# Patient Record
Sex: FEMALE | Race: BLACK OR AFRICAN AMERICAN | NOT HISPANIC OR LATINO | URBAN - METROPOLITAN AREA
[De-identification: names, ages, dates, MRNs, and addresses within clinical notes are randomized per-mention and may not be internally consistent; named-entity substitution may affect disease eponyms.]

---

## 2017-06-24 ENCOUNTER — INPATIENT (INPATIENT)
Facility: HOSPITAL | Age: 22
LOS: 0 days | Discharge: HOME | DRG: 747 | End: 2017-06-24
Attending: OBSTETRICS & GYNECOLOGY | Admitting: OBSTETRICS & GYNECOLOGY
Payer: COMMERCIAL

## 2017-06-24 VITALS
HEART RATE: 64 BPM | TEMPERATURE: 98 F | RESPIRATION RATE: 18 BRPM | SYSTOLIC BLOOD PRESSURE: 112 MMHG | DIASTOLIC BLOOD PRESSURE: 68 MMHG

## 2017-06-24 VITALS
HEART RATE: 85 BPM | DIASTOLIC BLOOD PRESSURE: 77 MMHG | TEMPERATURE: 98 F | OXYGEN SATURATION: 99 % | RESPIRATION RATE: 16 BRPM | SYSTOLIC BLOOD PRESSURE: 108 MMHG

## 2017-06-24 DIAGNOSIS — R10.9 UNSPECIFIED ABDOMINAL PAIN: ICD-10-CM

## 2017-06-24 DIAGNOSIS — Z33.2 ENCOUNTER FOR ELECTIVE TERMINATION OF PREGNANCY: Chronic | ICD-10-CM

## 2017-06-24 DIAGNOSIS — Z98.890 OTHER SPECIFIED POSTPROCEDURAL STATES: ICD-10-CM

## 2017-06-24 LAB
ALBUMIN SERPL ELPH-MCNC: 3.5 G/DL — SIGNIFICANT CHANGE UP (ref 3.3–5)
ALP SERPL-CCNC: 37 U/L — LOW (ref 40–120)
ALT FLD-CCNC: 13 U/L RC — SIGNIFICANT CHANGE UP (ref 10–45)
ANION GAP SERPL CALC-SCNC: 12 MMOL/L — SIGNIFICANT CHANGE UP (ref 5–17)
APTT BLD: 30.8 SEC — SIGNIFICANT CHANGE UP (ref 27.5–37.4)
AST SERPL-CCNC: 19 U/L — SIGNIFICANT CHANGE UP (ref 10–40)
BASOPHILS # BLD AUTO: 0.1 K/UL — SIGNIFICANT CHANGE UP (ref 0–0.2)
BASOPHILS NFR BLD AUTO: 0.6 % — SIGNIFICANT CHANGE UP (ref 0–2)
BILIRUB SERPL-MCNC: 0.2 MG/DL — SIGNIFICANT CHANGE UP (ref 0.2–1.2)
BLD GP AB SCN SERPL QL: NEGATIVE — SIGNIFICANT CHANGE UP
BUN SERPL-MCNC: 6 MG/DL — LOW (ref 7–23)
CALCIUM SERPL-MCNC: 8.8 MG/DL — SIGNIFICANT CHANGE UP (ref 8.4–10.5)
CHLORIDE SERPL-SCNC: 101 MMOL/L — SIGNIFICANT CHANGE UP (ref 96–108)
CO2 SERPL-SCNC: 21 MMOL/L — LOW (ref 22–31)
CREAT SERPL-MCNC: 0.54 MG/DL — SIGNIFICANT CHANGE UP (ref 0.5–1.3)
EOSINOPHIL # BLD AUTO: 0.1 K/UL — SIGNIFICANT CHANGE UP (ref 0–0.5)
EOSINOPHIL NFR BLD AUTO: 0.4 % — SIGNIFICANT CHANGE UP (ref 0–6)
GLUCOSE SERPL-MCNC: 186 MG/DL — HIGH (ref 70–99)
HCT VFR BLD CALC: 32.2 % — LOW (ref 34.5–45)
HCT VFR BLD CALC: 34.8 % — SIGNIFICANT CHANGE UP (ref 34.5–45)
HGB BLD-MCNC: 10.8 G/DL — LOW (ref 11.5–15.5)
HGB BLD-MCNC: 11.8 G/DL — SIGNIFICANT CHANGE UP (ref 11.5–15.5)
INR BLD: 1 RATIO — SIGNIFICANT CHANGE UP (ref 0.88–1.16)
LYMPHOCYTES # BLD AUTO: 1.5 K/UL — SIGNIFICANT CHANGE UP (ref 1–3.3)
LYMPHOCYTES # BLD AUTO: 11.3 % — LOW (ref 13–44)
MCHC RBC-ENTMCNC: 30.5 PG — SIGNIFICANT CHANGE UP (ref 27–34)
MCHC RBC-ENTMCNC: 30.6 PG — SIGNIFICANT CHANGE UP (ref 27–34)
MCHC RBC-ENTMCNC: 33.6 GM/DL — SIGNIFICANT CHANGE UP (ref 32–36)
MCHC RBC-ENTMCNC: 33.9 GM/DL — SIGNIFICANT CHANGE UP (ref 32–36)
MCV RBC AUTO: 89.9 FL — SIGNIFICANT CHANGE UP (ref 80–100)
MCV RBC AUTO: 91.1 FL — SIGNIFICANT CHANGE UP (ref 80–100)
MONOCYTES # BLD AUTO: 0.9 K/UL — SIGNIFICANT CHANGE UP (ref 0–0.9)
MONOCYTES NFR BLD AUTO: 6.8 % — SIGNIFICANT CHANGE UP (ref 2–14)
NEUTROPHILS # BLD AUTO: 11 K/UL — HIGH (ref 1.8–7.4)
NEUTROPHILS NFR BLD AUTO: 80.9 % — HIGH (ref 43–77)
PLATELET # BLD AUTO: 212 K/UL — SIGNIFICANT CHANGE UP (ref 150–400)
PLATELET # BLD AUTO: 228 K/UL — SIGNIFICANT CHANGE UP (ref 150–400)
POTASSIUM SERPL-MCNC: 4 MMOL/L — SIGNIFICANT CHANGE UP (ref 3.5–5.3)
POTASSIUM SERPL-SCNC: 4 MMOL/L — SIGNIFICANT CHANGE UP (ref 3.5–5.3)
PROT SERPL-MCNC: 6.4 G/DL — SIGNIFICANT CHANGE UP (ref 6–8.3)
PROTHROM AB SERPL-ACNC: 10.9 SEC — SIGNIFICANT CHANGE UP (ref 9.8–12.7)
RBC # BLD: 3.53 M/UL — LOW (ref 3.8–5.2)
RBC # BLD: 3.87 M/UL — SIGNIFICANT CHANGE UP (ref 3.8–5.2)
RBC # FLD: 11.8 % — SIGNIFICANT CHANGE UP (ref 10.3–14.5)
RBC # FLD: 11.8 % — SIGNIFICANT CHANGE UP (ref 10.3–14.5)
RH IG SCN BLD-IMP: POSITIVE — SIGNIFICANT CHANGE UP
SODIUM SERPL-SCNC: 134 MMOL/L — LOW (ref 135–145)
WBC # BLD: 13.6 K/UL — HIGH (ref 3.8–10.5)
WBC # BLD: 19.1 K/UL — HIGH (ref 3.8–10.5)
WBC # FLD AUTO: 13.6 K/UL — HIGH (ref 3.8–10.5)
WBC # FLD AUTO: 19.1 K/UL — HIGH (ref 3.8–10.5)

## 2017-06-24 PROCEDURE — 49320 DIAG LAPARO SEPARATE PROC: CPT | Mod: 62,GC

## 2017-06-24 PROCEDURE — 85730 THROMBOPLASTIN TIME PARTIAL: CPT

## 2017-06-24 PROCEDURE — 99285 EMERGENCY DEPT VISIT HI MDM: CPT | Mod: 25

## 2017-06-24 PROCEDURE — 85610 PROTHROMBIN TIME: CPT

## 2017-06-24 PROCEDURE — 86850 RBC ANTIBODY SCREEN: CPT

## 2017-06-24 PROCEDURE — C1889: CPT

## 2017-06-24 PROCEDURE — 85027 COMPLETE CBC AUTOMATED: CPT

## 2017-06-24 PROCEDURE — 49320 DIAG LAPARO SEPARATE PROC: CPT

## 2017-06-24 PROCEDURE — 86901 BLOOD TYPING SEROLOGIC RH(D): CPT

## 2017-06-24 PROCEDURE — 80053 COMPREHEN METABOLIC PANEL: CPT

## 2017-06-24 PROCEDURE — 99285 EMERGENCY DEPT VISIT HI MDM: CPT

## 2017-06-24 PROCEDURE — 86900 BLOOD TYPING SEROLOGIC ABO: CPT

## 2017-06-24 RX ORDER — MORPHINE SULFATE 50 MG/1
4 CAPSULE, EXTENDED RELEASE ORAL ONCE
Qty: 0 | Refills: 0 | Status: DISCONTINUED | OUTPATIENT
Start: 2017-06-24 | End: 2017-06-24

## 2017-06-24 RX ORDER — OXYCODONE HYDROCHLORIDE 5 MG/1
1 TABLET ORAL
Qty: 20 | Refills: 0 | OUTPATIENT
Start: 2017-06-24

## 2017-06-24 RX ORDER — ACETAMINOPHEN 500 MG
2 TABLET ORAL
Qty: 0 | Refills: 0 | COMMUNITY

## 2017-06-24 RX ORDER — ONDANSETRON 8 MG/1
4 TABLET, FILM COATED ORAL ONCE
Qty: 0 | Refills: 0 | Status: DISCONTINUED | OUTPATIENT
Start: 2017-06-24 | End: 2017-06-24

## 2017-06-24 RX ORDER — SODIUM CHLORIDE 9 MG/ML
1000 INJECTION INTRAMUSCULAR; INTRAVENOUS; SUBCUTANEOUS ONCE
Qty: 0 | Refills: 0 | Status: COMPLETED | OUTPATIENT
Start: 2017-06-24 | End: 2017-06-24

## 2017-06-24 RX ORDER — HYDROMORPHONE HYDROCHLORIDE 2 MG/ML
0.25 INJECTION INTRAMUSCULAR; INTRAVENOUS; SUBCUTANEOUS
Qty: 0 | Refills: 0 | Status: DISCONTINUED | OUTPATIENT
Start: 2017-06-24 | End: 2017-06-24

## 2017-06-24 RX ORDER — IBUPROFEN 200 MG
1 TABLET ORAL
Qty: 0 | Refills: 0 | COMMUNITY

## 2017-06-24 RX ORDER — SODIUM CHLORIDE 9 MG/ML
1000 INJECTION, SOLUTION INTRAVENOUS
Qty: 0 | Refills: 0 | Status: DISCONTINUED | OUTPATIENT
Start: 2017-06-24 | End: 2017-06-24

## 2017-06-24 RX ADMIN — MORPHINE SULFATE 4 MILLIGRAM(S): 50 CAPSULE, EXTENDED RELEASE ORAL at 16:03

## 2017-06-24 RX ADMIN — MORPHINE SULFATE 4 MILLIGRAM(S): 50 CAPSULE, EXTENDED RELEASE ORAL at 14:24

## 2017-06-24 RX ADMIN — SODIUM CHLORIDE 125 MILLILITER(S): 9 INJECTION, SOLUTION INTRAVENOUS at 20:47

## 2017-06-24 RX ADMIN — SODIUM CHLORIDE 2000 MILLILITER(S): 9 INJECTION INTRAMUSCULAR; INTRAVENOUS; SUBCUTANEOUS at 16:18

## 2017-06-24 RX ADMIN — MORPHINE SULFATE 4 MILLIGRAM(S): 50 CAPSULE, EXTENDED RELEASE ORAL at 15:57

## 2017-06-24 NOTE — ED PROCEDURE NOTE - GENERAL PROCEDURE NAME
POCUS: Emergency Department Focused Ultrasound performed at patient's bedside.  The complete report will be available in PACS. POCUS: Emergency Department Focused Ultrasound performed at patient's bedside.

## 2017-06-24 NOTE — BRIEF OPERATIVE NOTE - PRE-OP DX
Uterine perforation  06/24/2017    Active  Ion Crawley
Uterine perforation  06/24/2017    Active  Ion Crawley

## 2017-06-24 NOTE — ED ADULT NURSE NOTE - OBJECTIVE STATEMENT
no distress. [facility] and was [facility] to [facility]. [facility]. Mild vaginal bleeding noted. Pt anxious. Emotional support offered.

## 2017-06-24 NOTE — ASU DISCHARGE PLAN (ADULT/PEDIATRIC). - NOTIFY
GYN Fever>100.4/Persistent Nausea and Vomiting/Bleeding that does not stop/Unable to Urinate/Pain not relieved by Medications

## 2017-06-24 NOTE — H&P ADULT - PROBLEM SELECTOR PLAN 1
to OR for diagnostic laparoscopy, possible operative laparoscopy, possible repair of bowel or bladder, possible laparotomy.  -NPO, IVF, pain control PRN  -preop labs sent  -will let gen surg know for possible intraop consult  -operative consents signed   Heather Navarrete [id number]

## 2017-06-24 NOTE — ASU DISCHARGE PLAN (ADULT/PEDIATRIC). - FOLLOWUP APPOINTMENT CLINIC/PHYSICIAN
Please call [facility] on Monday to scheduled follow up appointment next week. Please call [phone]
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
## 2017-06-24 NOTE — H&P ADULT - NSHPPHYSICALEXAM_GEN_ALL_CORE
gen: NAD AAOx3  card: S1S2 RRR  resp CTAB  abd: soft, tender in RLQ and suprabic region, no rebound or guarding  : scant bleeding on pad  ext: NT BL
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
## 2017-06-24 NOTE — BRIEF OPERATIVE NOTE - OPERATION/FINDINGS
12wk size uterus; perforation noted in the posterior uterus/cul de sac with superficial abrasion of the left infundibulopelvic ligament, left tube and ovary intact, right adnexa wnl; bowel run by general surgery and found to be intact
diagnostic laparoscopy, ran bowel from TI to ligament of treitz, no injury found. Evaluated sigmoid, rectum, no injury noted
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
## 2017-06-24 NOTE — ASU DISCHARGE PLAN (ADULT/PEDIATRIC). - MEDICATION SUMMARY - MEDICATIONS TO TAKE
I will START or STAY ON the medications listed below when I get home from the hospital:    Motrin 600 mg oral tablet  -- 1 tab(s) by mouth every 6 hours  -- Indication: For pain    Tylenol 500 mg oral tablet  -- 2 tab(s) by mouth every 6 hours  -- Indication: For pain    oxyCODONE 5 mg oral tablet  -- 1 tab(s) by mouth every 6 hours, As Needed MDD:8  -- Caution federal law prohibits the transfer of this drug to any person other  than the person for whom it was prescribed.  It is very important that you take or use this exactly as directed.  Do not skip doses or discontinue unless directed by your doctor.  May cause drowsiness.  Alcohol may intensify this effect.  Use care when operating dangerous machinery.  This prescription cannot be refilled.  Using more of this medication than prescribed may cause serious breathing problems.    -- Indication: For pain

## 2017-06-24 NOTE — ED ADULT NURSE NOTE - ED STAT RN HANDOFF DETAILS
Pt to OR. Pt alert and orientedX4. No distress. Pt became hypotensive. No active bleeding at this time but sonogram at the bedside done and decision to bring her to the OR done. Report to JJ Barroso in OR
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
## 2017-06-24 NOTE — BRIEF OPERATIVE NOTE - PROCEDURE
Diagnostic laparoscopy  06/24/2017  repair of abrated infundibulopelvic ligament  Active  Ion Crawley
Diagnostic laparoscopy  06/24/2017    Active  [id number]

## 2017-06-24 NOTE — ED PROCEDURE NOTE - PROCEDURE ADDITIONAL DETAILS
POCUS: Emergency Department Focused Ultrasound performed at patient's bedside.  The complete report will be available in PACS.     Limited abdominal us:  +FF; ? air in suprapubic region above bladder (a lines / reverb noted coming from subcutaneous layer through free fluid.  -Cale POCUS: Emergency Department Focused Ultrasound performed at patient's bedside.      Limited abdominal us:  +FF; ? air in suprapubic region above bladder (a lines / reverb noted coming from subcutaneous layer through free fluid.  -Cale
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
## 2021-03-26 NOTE — ED PROVIDER NOTE - NEUROLOGICAL, MLM
Patient/Caregiver provided printed discharge information. No focal deficits, no motor or sensory deficits.

## 2022-11-25 NOTE — ED PROVIDER NOTE - DURATION
Admission Medication History Completed by Pharmacy    See [facility] Admission Navigator for allergy information, preferred outpatient pharmacy, prior to admission medications and immunization status.     Medication History Sources:     Patient, dispense report    Changes made to PTA medication list (reason):    Added: None    Deleted: None    Changed: None    Additional Information:    None    Prior to Admission medications    Medication Sig Last Dose Taking? Auth Provider Long Term End Date   allopurinol (ZYLOPRIM) 100 MG tablet  Last filled 9/19/22 #135   Take 150 mg by mouth daily  11/25/2022 at am Yes Reported, Patient No    amLODIPine (NORVASC) 10 MG tablet  Last filled 10/8/22 #90   TAKE 1 TABLET BY MOUTH EVERY DAY 11/25/2022 at am Yes Patricia Blue APRN CNP Yes    B-12 TR 1000 MCG CR tablet  Last filled 8/22/22 #90   Take 1,000 mcg by mouth daily 11/25/2022 at am Yes Reported, Patient     carvedilol (COREG) 6.25 MG tablet  Last filled 11/19/22 #180   Take 1 tablet (6.25 mg) by mouth 2 times daily (with meals) 11/25/2022 at am Yes Rita Muhammad MD Yes    cephALEXin (KEFLEX) 500 MG capsule  Last filled 11/15/22 #30 Take 1 capsule (500 mg) by mouth 3 times daily for 10 days Past Week Yes Francine Craig PA-C  11/25/22   Cholecalciferol (VITAMIN D3) 50 MCG (2000 UT) CAPS  Last filled 8/17/22 #180 TAKE 2 CAPSULES BY MOUTH EVERY DAY 11/25/2022 at am Yes Laury Wong MD     Dulaglutide (TRULICITY) 3 MG/0.5ML SOPN  Last filled 10/12/22 #4mL Inject 3 mg Subcutaneous once a week 11/21/2022 Yes Laury Wong MD     famotidine (PEPCID) 20 MG tablet  Last filled 10/20/22 #180 TAKE 1 TABLET BY MOUTH TWICE A DAY 11/25/2022 at am Yes Hamilton Sapp MD     hydrALAZINE (APRESOLINE) 100 MG tablet  Last filled 10/20/22 #270 Three times per day take one 100mg tab and one 25mg tab for a total dose of 125mg three times per day 11/25/2022 at am Yes Riat Muhammad,  MD Yes    lactobacillus rhamnosus, GG, (CULTURELL) capsule Take 1 capsule by mouth daily 11/25/2022 Yes Mk Terrazas MD     magnesium oxide 400 MG CAPS  Last filled 8/4/22 #90 Take 1 tablet by mouth daily 11/24/2022 at pm Yes Rita Muhammad MD     multivitamin, therapeutic with minerals (THERA-VIT-M) TABS tablet   Take 1 tablet by mouth daily 11/25/2022 at am Yes Ju Jackson PA     pantoprazole (PROTONIX) 40 MG EC tablet  Last filled 11/6/22 #180   Take 1 tablet (40 mg) by mouth 2 times daily 11/25/2022 Yes Rita Muhammad MD     potassium chloride ER (KLOR-CON) 10 MEQ CR tablet  Last filled 8/18/22 #900   Take 6 tablets (60 mEq) by mouth 2 times daily 11/25/2022 at am Yes iRta Muhammad MD No    pravastatin (PRAVACHOL) 20 MG tablet  Last filled 11/14/22 #90   Take 1 tablet (20 mg) by mouth every evening 11/24/2022 at pm Yes Rita Muhammad MD Yes    spironolactone (ALDACTONE) 25 MG tablet  Last filled 9/21/22 #90   Take 1 tablet (25 mg) by mouth daily 11/25/2022 at am Yes Patricia Blue APRN CNP Yes    torsemide (DEMADEX) 20 MG tablet  Last filled 9/21/22 #90 TAKE 1 TABLET BY MOUTH EVERY DAY 11/25/2022 at am Yes Mk Terrazas MD Yes    warfarin ANTICOAGULANT (COUMADIN) 1 MG tablet  Last filled 10/6/22 #180 Take 2 to 2.5mg (2 to 2.5 tabs) by mouth daily OR AS DIRECTED.  Adjust dose based on INR.  Patient taking differently: Take by mouth See Admin Instructions Take 1.5 tablets (1.5 mg) on Sunday, Monday, Wednesday, Thursday and Saturday OR AS DIRECTED.  Adjust dose based on INR. 11/24/2022 Yes Rita Muhammad MD No    warfarin ANTICOAGULANT (COUMADIN) 2 MG tablet  Last filled 9/1/22 #90 Take 1 tablet daily or as directed by coumadin clinic.  Patient taking differently: Take by mouth See Admin Instructions Take 1 tablet on Tuesday and Friday or as directed by coumadin clinic. Past Week Yes Rita Muhammad MD No    ACCU-CHEK GUIDE test  strip USE TO TEST ONCE DAILY   Edy Robertson PA-C     ACE/ARB/ARNI NOT PRESCRIBED (INTENTIONAL) Please choose reason not prescribed from choices below.  Patient not taking: Reported on 5/2/2022   Laury Wong MD Yes    acetaminophen (TYLENOL) 500 MG tablet Take 1-2 tablets (500-1000 mg) by mouth twice daily 11/25/2022 at am Yes Doc Proctor PA     blood glucose (NO BRAND SPECIFIED) lancets standard Use to test blood sugar 3 times daily or as directed.   Hamilton Sapp MD     blood glucose monitoring (NO BRAND SPECIFIED) meter device kit Use to test blood sugar 3 times daily or as directed.   Hamilton Sapp MD     hydrALAZINE (APRESOLINE) 25 MG tablet  Last filled 2/18/22 #1350 Take 5 tablets (125 mg) by mouth 3 times daily Three times per day take one 100mg tab and one 25mg tab for a total dose of 125mg three times per day 11/25/2022 at am Yes Rita Muhammad MD Yes    insulin pen needle (31G X 5 MM) 31G X 5 MM miscellaneous Use 1 pen needles daily or as directed.   Laury Wong MD         Date completed: 11/25/22    Medication history completed by: Aleida Kimball, RubioD           today

## 2025-06-13 NOTE — ED PROVIDER NOTE - CADM POA PRESS ULCER
No